# Patient Record
Sex: MALE | Race: WHITE
[De-identification: names, ages, dates, MRNs, and addresses within clinical notes are randomized per-mention and may not be internally consistent; named-entity substitution may affect disease eponyms.]

---

## 2017-03-17 ENCOUNTER — HOSPITAL ENCOUNTER (OUTPATIENT)
Dept: HOSPITAL 45 - C.ULTRBC | Age: 47
Discharge: HOME | End: 2017-03-17
Attending: INTERNAL MEDICINE
Payer: COMMERCIAL

## 2017-03-17 DIAGNOSIS — M79.605: Primary | ICD-10-CM

## 2017-03-17 DIAGNOSIS — R93.6: ICD-10-CM

## 2017-03-17 NOTE — DIAGNOSTIC IMAGING REPORT
LEFT THIGH ULTRASOUND

CLINICAL HISTORY: Left leg pain after fall.   



COMPARISON STUDY:  None.



FINDINGS: There is a a 2.5 x 1.8 x 0.5 cm focus of fluid within the mid

hamstring muscles. This consistent with an intramuscular tear. In addition, deep

to the junction of the left gluteal/proximal posterior thigh there is an 11.3 x

3.3 x 7.7 cm complex fluid collection. This favors a hematoma surrounding the

torn hamstring tendon complex. The serpiginous echogenic material within this

fluid collection likely represents the retracted and torn tendon.



IMPRESSION:  

1. Complex fluid collection deep to the left gluteal/proximal posterior thigh

junction which measures 11.3 x 7.7 x 3.3 cm. This favors a hematoma surrounding

a full-thickness tear of the retracted hamstring tendon complex. This can be

confirmed with MRI.

2. There is also a 2.5 x 1.8 x 0.5 cm intramuscular fluid collection within the

mid hamstring muscles. This is consistent with an intramuscular tear/hematoma.





 







Electronically signed by:  Stoney Felix M.D.

3/17/2017 3:48 PM



Dictated Date/Time:  3/17/2017 3:38 PM

## 2018-03-13 ENCOUNTER — HOSPITAL ENCOUNTER (OUTPATIENT)
Dept: HOSPITAL 45 - C.NEUR | Age: 48
Discharge: HOME | End: 2018-03-13
Attending: INTERNAL MEDICINE
Payer: COMMERCIAL

## 2018-03-13 DIAGNOSIS — G47.30: Primary | ICD-10-CM

## 2018-03-14 NOTE — PAP/PSG TECHNICIAN REPORT
[facility]

Technician Polysomnogram Report



Study name:

None

Report date:

3/14/2018



Study date:

3/13/2018

Referring Physician:

 Beata Sow PA-C



Name:

DARVIN MCDONALD

Interpreting Physician:

Scottie Mendosa M.D.



YOB: 1970

Technician:

Luisa Salinas RPSGT.



Sex:

Male







Age:

47

Study Type:

PSG PAP



Weight:

321 lbs





21.5 IN



Height:

47 years, Height 5' 10"

Neck Circum:







BMI:

46.05







Medications:

BUPROPION 100 MG, ESCITALORPAM OXALATE 20 MG, FAMOTIDINE 20 MG, OMEPRAZOLE 20 MG, LISINOPRIL 
HYDROCHLOROTHIAZIDE 20-12.5 MG, METFORMIN 500 MG, ROPINIROLE 0.5 MG















Patient History





47 yr-old male here for a CPAP update study.  He has been using CPAP for many years and has no 
problems. He states that he cannot sleep without it.  He has gained some weight since the last 
study.  He is back to assess his pressure settings and to satisfy DOT requirements.  His [place] 
scale is 0.

The test was started on room air and 4 CMH2O.  The pressure was then increased to 7 CMH2O at his 
request for more air.

ETCO2 testing was not utilized during this study.

He requested to get up at 4:30 am for work.

Room 1







Parameters Monitored

NPSG: E1-M2, E2-M1, Fp1-M2, Fp2-M1, F3-M2, F4-M2, F4-M1, C3-M2, C4-M2, C4-M1, O1-M2, O2-M2,

O2-M1, T3-M2, T4-M1, P3-M2, P4-M1, CHIN1, CHIN2, HR, EKG, Legs, PFLOW, SNOR, FLOW, CFLOW,

Tidal Volume, THOR, ABDO, SpO2, PLTH, CPRESS, ETCO2 Wave, ETCO2, pH





Sleep Architecture



Sleep Stages



Time at Lights Off

8:45:04 PM



STAGES

Time (min.)

TST (%)



Time at Lights On

4:31:04 AM



Wake

194.5

--



Total Recording Time (TRT)

466.00 min.



N1

71.0

26



Total Sleep Period (TSP)

428.0 min.



N2

132.0

49



Total Sleep Time (TST)

271.5min.



N3

26.0

10



Awake Time

194.5 min.



REM

42.5

16



Wake after Sleep Onset

156.5 min.











Sleep Efficiency (SE)

58 %











Sleep Onset Latency (SOL)

38.0 min.











Number of Stage 1 Shifts

None











Awakenings

30











Stage Changes

106











Number of REM periods

2



REM

42.5

16



REM Latency

309.5 min.



NREM

229.0

84





Body Position Analysis





Supine

Right

Left

Side

Prone

Vertical



Total Sleep Time (min.)

53.9

185.7

49.6

235.35

0.0

0.0



Total Sleep Time (%)

13%

68%

18%

87

0%

N/A%



Total Sleep Time REM (min.)

25.5

17.0

0.0

None

0.0

0.0



Total Sleep Time NREM (min.)

10.6

168.7

49.6

None

0.0

0.0



Intermittent Wake (min.)

17.8

100.3

76.4

None

0.0

0.0



Total Sleep Period (%)

11%

None





Arousals







Myoclonus (PLM) *







Events

Count

Index



Events

Count

Index



Spontaneous

39

9



Events Awake (PLMW)

145

44.7



Respiratory

9

2.2



Events Asleep w/ Arousal (PLMA)

35

7.7



PLM

34

8



Events Asleep w/o Arousal (PLMS)

325

71.8



Snoring

24

5



Total Asleep

360

79.6



Total

106

23



Total

505

65







Respiratory Analysis *





CA

OA

MA

CH

H

RERA

Total



Count

0

1

0

0

58

5

59



Index

0.0

0.2

0.0

0

12.8

1

14.1



Mean Duration

0.0

12.4

0.0

0.00

16.9

18.9

16.9



Longest Duration

0.0

12.4

0.0

0.00

0.0

21.1

27.4







Respiratory Event Summary 







Total

Supine

~Supine

Right

Left

Prone

REM

NREM



Apneas

Count

1

0

1

1

0

N/A

0

1





Index

0.2

0

0

0.3

0.0

N/A

0

0



Hypopneas (4% Desat)

Count

58

1

57

52

5

N/A

0

58





Index

12.8

1.7

15

16.8

6.0

N/A

0.0

15.2



Apneas & All Hypopneas 

Count

59

1

58

53

5

N/A

0

59





Index

13.0

2

15

17

6

N/A

0.0

15.5



Respiratory Events 

(Apn+All Hyp+RERA)

Count

59

1

63

56

7

N/A

0

59





Index

14.1

2

16

18.1

8.5

N/A

0.0

16.8



Respiratory Related Arousal

Count

9

1

10

8

2

N/A

0

10





Index

2.2

0

3

3

2

N/A

0

3





Snoring Analysis





Supine

Right

Left

Prone

REM

NREM

Total



Snore duration

34.3 min



Snores count

103

803

365

N/A

25

1,246

1,271



Snore mean duration

1.6 Sec



Snores index

171

259

441

N/A

35.3

326.5

280.9



TST with snoring (%)

12.6%







Desaturation Event Summary:



Minimum %SpO2

Event Count

Mean/Min/Max Duration(sec.)

Desaturation Index

% Time In Bed



> 90

146

22.0 / 0.1 / 57.8

32.2

59.7



86 - 90

53

16.8 / 8.0 / 37.5

17.3

40.2



81 - 85

0

N/A

0.0

0.0



76 - 80

0

N/A

0.0

0.0



71 - 75

0

N/A

0.0

0.0



66 - 70

0

N/A

0.0

0.0



61 - 65

0

N/A

0.0

0.0



56 - 60

0

N/A

0.0

0.0



51 - 55

0

N/A

0.0

0.0



< 50

0

N/A

0.0

0.0









Total

REM

NREM

Awake



<50%

0.0 min.



51 - 60%

0.0 min.



61 - 70%

0.0 min.



71 - 80%

0.2 min.

0.0 min.

0.0 min.

0.2 min.



81 - 90%

183.4 min.

10.9 min.

137.6 min.

34.9 min.



91 - 100%

272.4 min.

31.6 min.

91.3 min.

149.5 min.



Average

91

91

90

92



Minimum SpO2

79

87

86

79



Desaturation Event Index

20.0

2.8

30.4

11.7



# Desat. Events below 89%

102

N/A

88

14



Time(%) with Saturation below 89%

8.6

0.1

7.5

1.0



Time(min.) with Saturation below 89%

39.3

0.4

34.4

4.5









Time (mins)

REM (mins)

NREM (mins)

% of TST



SpO2 Below 90%

115

2

N113

29.4



SpO2 Below 88%

30

0

0

4





Heart Rate Analysis









Min (bpm)

Max (bpm)

Average (bpm)



Awake

51

127

74



NREM

50

84

65



REM

54

72

59



Overall

50

84

64













Supplemental O2 Values



Minimum O2 level: None



Value  

Start Time

End Time





Technician Comments





Mr. Mcdonald slept in the right, left, and supine positions.  No cardiac arrhythmias were noted.  PLMs 
were noted.  No bruxism noted.  CPAP was initiated at +4 CMH2O and up-titrated to a level of +14 
CMH2O, Cflex 2.  A Mirage Quattro full face mask size medium from [employer] was used during titration.  
He awoke to use the restroom one time during the night.  Mr. Mcdonald stated that he did not sleep as 
well as usual.

The final report will be interpreted and signed by a sleep physician. The completed physician report 
will then be placed in the patient medical record.







 



 



 



 



 



 



 



 

CPAP REPORT



Therapy Detail

Time / Page #

Comment



CPAP 4 cm H2O  

Full Face Mask  

Flex Pressure Relief  

Humidifier on  



8:43:28 PM / pg. 65





CPAP 7 cm H2O  

Full Face Mask  

Flex Pressure Relief  

Humidifier on  



8:45:22 PM / pg. 69

INCREASED PRESSURE FOR HIS REQUEST FOR MORE AIR.  HE IS USED TO STARTING HIS CPAP RIGHT AT 11 CM AT 
HOME.



CPAP 9 cm H2O  

Full Face Mask  

Flex Pressure Relief  

Humidifier on  



10:40:22 PM / pg. 299

INCREASED FOR SNORING AND HYPOPNEAS



CPAP 11 cm H2O  

Full Face Mask  

Flex Pressure Relief  

Humidifier on  



1:09:10 AM / pg. 597

INCREASED FOR HYPOPNEAS



CPAP 12 cm H2O  

Full Face Mask  

Flex Pressure Relief  

Humidifier on  



1:57:15 AM / pg. 693

INCREASED FOR HYPOPNEAS



CPAP 14 cm H2O  

Full Face Mask  

Flex Pressure Relief  

Humidifier on  



2:22:26 AM / pg. 743

INCREASED FOR HYPOPNEAS







Therapy Event:



Therapy (cm H20)

4

7

9

11

12

14



Total Time at Pressure (min.)

0.3

115.0

148.8

48.1

25.2

128.6



TST at Pressure (min.)

0.0

31.8

53.3

45.1

18.7

122.6



# Periods

1



Sleep Onset (min.)

N/A

37.7

0.0



REM Onset (min.)

N/A

10.1



Sleep Efficiency %

0

27

35

93

74

95



Wakefulness (%)

100.0

72.3

64.2

6.2

25.8

4.7



Wakefulness (min.)

0.3

83.2

95.5

3.0

6.5

6.0



NREM 1 (%)

0.0

19.4

23.3

7.3

11.9

5.8



NREM 1 (min.)

0.0

22.3

34.7

3.5

3.0

7.5



NREM 2 (%)

0.0

8.3

12.5

86.5

62.3

36.3



NREM 2 (min.)

0.0

9.5

18.6

41.6

15.7

46.6



NREM 3 (%)

0.0

20.2



NREM 3 (min.)

0.0

26.0



REM (%)

0.0

33.0



REM (min.)

0.0

42.5



# Arousals

N/A

41

41

6

9

9



Arousal Index

N/A

77.4

46.2

8.0

28.9

4.4



# Snore

N/A

89

267

436

196

283



Snore Index

N/A

167.9

300.6

580.1

629.7

138.5



AHI

N/A

15.1

18.0

37.3

16.1

1.0



AHI Supine

N/A

1.7



AHI Non-Supine

N/A

15.1

18.0

37.3

16.1

0.7



NREM AHI

N/A

15.1

18.0

37.3

16.1

1.5



REM AHI

N/A

0.0



RDI

N/A

17.0

19.1

38.6

16.1

2.0



# Obstructive

N/A

0

1

0



# Central Ap

N/A

0



# Mixed

N/A

0



# Hypopneas

N/A

8

15

28

5

2



RERAS

N/A

1

0

2



Total Respiratory Events

N/A

9

17

29

5

4



Time Below SpO2 89.00% (min.)

0.0

8.0

8.2

8.7

1.0

8.9



Mean NREM SpO2 (%)

N/A

90

91

90



Mean REM SpO2 (%)

N/A

91



Mean Sleep SpO2 (%)

N/A

90

91

91



Min NREM SpO2 (%)

N/A

86

87

87



Min REM SpO2 (%)

N/A

87



Position Supine (min.)

0.0

36.1



Position Non-supine (min.)

0.0

31.8

53.3

45.1

18.7

86.5



LM Index Sleep

N/A

156.6

112.6

170.3

102.8

8.3



LM Index NREM

N/A

156.6

112.6

170.3

102.8

6.7



LM Index REM

N/A

11.3



Mean Heart Rate (bpm)

N/A

72

68

67

61

59



Min Heart Rate (bpm)

N/A

63

60

59

54

50

## 2018-03-17 NOTE — POLYSOMNOGRAPH REPORT
CLINICAL DATA:  A 47-year-old male with BMI of 46, referred by Beata Sow

for a CPAP update study.  He has been on CPAP for years and cannot sleep

without it.  He has gained weight since his last study and is referred to

reassess his pressure settings and also to satisfy his DOT requirements.

 

SLEEP ARCHITECTURE:  Total sleep period was 428 minutes.  Total sleep time

was 271.5 minutes divided between 229 minutes of non-REM sleep and 42.5

minutes of REM sleep.  Sleep latency was delayed at 38 minutes.  REM latency

was delayed at 309.5 minutes.  Sleep efficiency was reduced at 58%.  Wake

after sleep onset was elevated at 156.5 minutes.  Sleep consisted of stage N1

26%, stage N2 49%, stage N3 10% and REM 16%.

 

AROUSAL DATA:  106 arousals were recorded for an index of 23 per hour.  39 were

spontaneous.  34 were due to PLM events.

 

PERIODIC LIMB MOVEMENT DATA:  Severe PLMD was noted.  There were 360 limb

movements during sleep noted for an index of 79.6 per hour with arousal index

of 7.7 per hour.

 

RESPIRATORY DATA:  AHI was 13.  There was 1 obstructive apneic episode, 12.4

seconds in duration.  There were 50 hypopneic episodes with mean duration of

16.9 seconds.

 

OXIMETRY DATA:  Mild hypoxemia was seen.  Oxygen susie was 86%.  Mean

saturation was 91%.  Time below 88% was 30 minutes.

 

HEART RATE DATA:  Heart rates ranged from 50-84 beats per minute.  No

arrhythmias were noted.

 

TECHNICIAN'S COMMENTS AND TREATMENT SUMMARY:  The patient slept in the right,

left and supine position.  A Mirage Quattro full face mask size medium from

[employer] was used.  The patient was started on CPAP and was titrated up to a

final pressure setting of 14 cm of water pressure.  At his final pressure

setting, he slept for 122.6 minutes with an AHI of 1.

 

IMPRESSION:  Obstructive sleep apnea corrected with CPAP 14 cm of water

pressure, C-Flex setting 2 with a Mirage Quattro full face mask size medium

from [employer].

 

RECOMMENDATIONS:  The patient's CPAP should be changed to 14 cm of water

pressure, C-Flex setting 2.  He should be seen back in followup within 90

days to document efficacy and compliance.

 

 

[facility]

## 2018-03-27 ENCOUNTER — HOSPITAL ENCOUNTER (OUTPATIENT)
Dept: HOSPITAL 45 - C.RAD | Age: 48
End: 2018-03-27
Attending: INTERNAL MEDICINE
Payer: COMMERCIAL

## 2018-03-27 DIAGNOSIS — R50.9: Primary | ICD-10-CM

## 2018-03-27 NOTE — DIAGNOSTIC IMAGING REPORT
CHEST 2 VIEWS ROUTINE



CLINICAL HISTORY: R50.9 [id number] dyspnea



COMPARISON STUDY:  5/10/2014



FINDINGS: The bones soft tissues and hemidiaphragms are normal. The

cardiomediastinal silhouette is normal. The lungs are clear. The pulmonary

vasculature is normal. 



IMPRESSION:  Negative chest. 











The above report was generated using voice recognition software.  It may contain

grammatical, syntax or spelling errors.









Electronically signed by:  Ryne Pyle M.D.

3/27/2018 5:49 PM



Dictated Date/Time:  3/27/2018 5:49 PM